# Patient Record
Sex: FEMALE | Race: ASIAN | NOT HISPANIC OR LATINO | URBAN - METROPOLITAN AREA
[De-identification: names, ages, dates, MRNs, and addresses within clinical notes are randomized per-mention and may not be internally consistent; named-entity substitution may affect disease eponyms.]

---

## 2018-02-01 ENCOUNTER — AMBULATORY - HEALTHEAST (OUTPATIENT)
Dept: MULTI SPECIALTY CLINIC | Facility: CLINIC | Age: 28
End: 2018-02-01

## 2018-02-01 LAB — PAP SMEAR - HIM PATIENT REPORTED: NORMAL

## 2018-09-11 ENCOUNTER — COMMUNICATION - HEALTHEAST (OUTPATIENT)
Dept: FAMILY MEDICINE | Facility: CLINIC | Age: 28
End: 2018-09-11

## 2018-09-11 ENCOUNTER — OFFICE VISIT - HEALTHEAST (OUTPATIENT)
Dept: FAMILY MEDICINE | Facility: CLINIC | Age: 28
End: 2018-09-11

## 2018-09-11 DIAGNOSIS — Z76.89 ESTABLISHING CARE WITH NEW DOCTOR, ENCOUNTER FOR: ICD-10-CM

## 2018-09-11 DIAGNOSIS — L91.0 KELOID SCAR: ICD-10-CM

## 2018-09-11 DIAGNOSIS — H53.8 BLURRY VISION: ICD-10-CM

## 2018-09-11 DIAGNOSIS — Z00.00 ANNUAL PHYSICAL EXAM: ICD-10-CM

## 2018-09-11 LAB
HCG UR QL: NEGATIVE
SP GR UR STRIP: 1.02 (ref 1–1.03)

## 2018-09-11 ASSESSMENT — MIFFLIN-ST. JEOR: SCORE: 1289.69

## 2018-09-12 ENCOUNTER — COMMUNICATION - HEALTHEAST (OUTPATIENT)
Dept: FAMILY MEDICINE | Facility: CLINIC | Age: 28
End: 2018-09-12

## 2018-09-14 ENCOUNTER — OFFICE VISIT - HEALTHEAST (OUTPATIENT)
Dept: FAMILY MEDICINE | Facility: CLINIC | Age: 28
End: 2018-09-14

## 2018-09-14 DIAGNOSIS — Z30.017 NEXPLANON INSERTION: ICD-10-CM

## 2018-10-10 ENCOUNTER — OFFICE VISIT - HEALTHEAST (OUTPATIENT)
Dept: FAMILY MEDICINE | Facility: CLINIC | Age: 28
End: 2018-10-10

## 2018-10-10 DIAGNOSIS — Z20.5 EXPOSURE TO HEPATITIS B: ICD-10-CM

## 2018-10-10 DIAGNOSIS — Z28.39 IMMUNIZATION DEFICIENCY: ICD-10-CM

## 2018-10-10 DIAGNOSIS — Z23 NEED FOR IMMUNIZATION AGAINST INFLUENZA: ICD-10-CM

## 2018-10-10 ASSESSMENT — MIFFLIN-ST. JEOR: SCORE: 1288.55

## 2018-10-12 LAB
HBV SURFACE AG SERPL QL IA: NEGATIVE
HEPATITIS B SURFACE ANTIBODY LHE- HISTORICAL: POSITIVE

## 2018-10-15 ENCOUNTER — COMMUNICATION - HEALTHEAST (OUTPATIENT)
Dept: FAMILY MEDICINE | Facility: CLINIC | Age: 28
End: 2018-10-15

## 2020-01-31 ENCOUNTER — OFFICE VISIT - HEALTHEAST (OUTPATIENT)
Dept: FAMILY MEDICINE | Facility: CLINIC | Age: 30
End: 2020-01-31

## 2020-01-31 DIAGNOSIS — L30.9 ECZEMA, UNSPECIFIED TYPE: ICD-10-CM

## 2020-01-31 RX ORDER — HYDROXYZINE HYDROCHLORIDE 25 MG/1
25-50 TABLET, FILM COATED ORAL EVERY 6 HOURS PRN
Qty: 60 TABLET | Refills: 0 | Status: SHIPPED | OUTPATIENT
Start: 2020-01-31

## 2020-01-31 RX ORDER — TRIAMCINOLONE ACETONIDE 1 MG/G
CREAM TOPICAL
Qty: 60 G | Refills: 1 | Status: SHIPPED | OUTPATIENT
Start: 2020-01-31

## 2020-01-31 ASSESSMENT — MIFFLIN-ST. JEOR: SCORE: 1215.97

## 2021-06-02 VITALS — HEIGHT: 59 IN | BODY MASS INDEX: 29.84 KG/M2 | WEIGHT: 148 LBS

## 2021-06-02 VITALS — BODY MASS INDEX: 30.04 KG/M2 | WEIGHT: 148 LBS

## 2021-06-02 VITALS — WEIGHT: 149 LBS | HEIGHT: 59 IN | BODY MASS INDEX: 30.04 KG/M2

## 2021-06-04 VITALS
DIASTOLIC BLOOD PRESSURE: 66 MMHG | WEIGHT: 133 LBS | HEIGHT: 59 IN | HEART RATE: 74 BPM | BODY MASS INDEX: 26.81 KG/M2 | SYSTOLIC BLOOD PRESSURE: 94 MMHG | RESPIRATION RATE: 14 BRPM

## 2021-06-05 NOTE — PROGRESS NOTES
ASSESSMENT/PLAN:  1. Eczema, unspecified type  triamcinolone (KENALOG) 0.1 % cream    hydrOXYzine HCl (ATARAX) 25 MG tablet       This is a 28 yo female with rash - this may be a contact dermatitis although no offending agent is identified.  Appears eczematous - add Triamcinolone cream to areas affected; use Hydroxyzine as needed for bedtime - to decrease itching  Return in about 2 weeks (around 2/14/2020).      There are no discontinued medications.  There are no Patient Instructions on file for this visit.    Chief Complaint:  Chief Complaint   Patient presents with     itchy skin       HPI:   Viraj Zhang is a 29 y.o. female c/o  Broke out in a rash a week ago  No new exposures - no new medications    No one at home with a rash  Rash is around neck line/right abdomen       PMH:   Patient Active Problem List    Diagnosis Date Noted     Nexplanon insertion 09/14/2018     Keloid scar 09/11/2018     History reviewed. No pertinent past medical history.  History reviewed. No pertinent surgical history.  Social History     Socioeconomic History     Marital status:      Spouse name: Not on file     Number of children: Not on file     Years of education: Not on file     Highest education level: Not on file   Occupational History     Not on file   Social Needs     Financial resource strain: Not on file     Food insecurity:     Worry: Not on file     Inability: Not on file     Transportation needs:     Medical: Not on file     Non-medical: Not on file   Tobacco Use     Smoking status: Never Smoker     Smokeless tobacco: Never Used     Tobacco comment: no tobacco exposure   Substance and Sexual Activity     Alcohol use: Not on file     Drug use: Not on file     Sexual activity: Yes     Partners: Male     Birth control/protection: None   Lifestyle     Physical activity:     Days per week: Not on file     Minutes per session: Not on file     Stress: Not on file   Relationships     Social connections:     Talks on phone: Not  "on file     Gets together: Not on file     Attends Mu-ism service: Not on file     Active member of club or organization: Not on file     Attends meetings of clubs or organizations: Not on file     Relationship status: Not on file     Intimate partner violence:     Fear of current or ex partner: Not on file     Emotionally abused: Not on file     Physically abused: Not on file     Forced sexual activity: Not on file   Other Topics Concern     Not on file   Social History Narrative    Originally from [facility].  [place].  Moved to [place] about 1-1/2 years ago.  Just moved to [place] because her family has friends here.     History reviewed. No pertinent family history.    Meds:    Current Outpatient Medications:      hydrOXYzine HCl (ATARAX) 25 MG tablet, Take 1-2 tablets (25-50 mg total) by mouth every 6 (six) hours as needed for itching., Disp: 60 tablet, Rfl: 0     triamcinolone (KENALOG) 0.1 % cream, Apply topically sparingly two times a day to affected areas only, Disp: 60 g, Rfl: 1    Current Facility-Administered Medications:      lidocaine 1%-EPINEPHrine 1:100,000 1 %-1:100,000 injection 2.5 mL (XYLOCAINE W/EPI), 2.5 mL, Other, Once, Margi Fleming,      lidocaine 10 mg/mL (1 %) injection 2.5 mL, 2.5 mL, Other, Once, Margi Fleming, DO    Allergies:  No Known Allergies    ROS:  Pertinent positives as noted in HPI; otherwise 12 point ROS negative.      Physical Exam:  EXAM:  BP 94/66 (Patient Site: Left Arm, Patient Position: Sitting, Cuff Size: Adult Regular)   Pulse 74   Resp 14   Ht 4' 10.5\" (1.486 m)   Wt 133 lb (60.3 kg)   BMI 27.32 kg/m     Gen:  NAD, appears well, well-hydrated  HEENT:  TMs nl, oropharynx benign, nasal mucosa nl, conjunctiva clear  Neck:  Supple, no adenopathy, no thyromegaly, no carotid bruits, no JVD  Lungs:  Clear to auscultation bilaterally  Cor:  RRR no murmur  Abd:  Soft, nontender, BS+, no masses, no guarding or rebound, no HSM  Extr:  " Neg.  Neuro:  No asymmetry  Skin:  Warm/dry, some excoriations on extremities - some small scaly patches; excoriation on abdomen        Results:  Results for orders placed or performed in visit on 10/10/18   Hepatitis B Surface Antigen (HBsAG)   Result Value Ref Range    Hepatitis B Surface Ag Negative Negative   Hepatitis B Surface Antibody (Anti-HBs)   Result Value Ref Range    Hep B Surface Antibody Positive (!) Negative

## 2021-06-16 PROBLEM — Z30.017 NEXPLANON INSERTION: Status: ACTIVE | Noted: 2018-09-14

## 2021-06-16 PROBLEM — L91.0 KELOID SCAR: Status: ACTIVE | Noted: 2018-09-11

## 2021-06-20 NOTE — PROGRESS NOTES
ASSESMENT AND PLAN:  Diagnoses and all orders for this visit:    Exposure to hepatitis B  -     Hepatitis B Surface Antigen (HBsAG)  -     Hepatitis B Surface Antibody (Anti-HBs)   is confirmed surface antigen positive.  Reviewed transmission with the patient and her .  All of the children are immunized.    Immunization deficiency, Need for immunization against influenza  -     Influenza, Seasonal,Quad Inj, 36+ MOS    Green Card/update imm.  Counseling done on immunization history and requirements with the patient.  Reviewed available immunization history and relevant lab records with patient and family.  Immunizations given as documented in the EHR and on form.  Acetaminophen as needed for post-immunization fever or myalgias.  [employer] form I-693 completed today with the patient.  Given to patient in a sealed labeled envelope and a copy is being scanned into the EHR.  Please see that form for further details.  Patient directed to follow-up in clinic for routine and preventative care.         SUBJECTIVE: 20-year-old female here for green card exam.  No recent fevers, no other concerns, no history of immunization reactions or problems.  Her  is noted to be hepatitis B surface antigen positive.  Patient does not have documentation of hepatitis B testing in the past.    No past medical history on file.  Patient Active Problem List   Diagnosis     Keloid scar     Nexplanon insertion     No current outpatient prescriptions on file.     Current Facility-Administered Medications   Medication Dose Route Frequency Provider Last Rate Last Dose     lidocaine 1%-EPINEPHrine 1:100,000 1 %-1:100,000 injection 2.5 mL (XYLOCAINE W/EPI)  2.5 mL Other Once Margi Fleming DO         lidocaine 10 mg/mL (1 %) injection 2.5 mL  2.5 mL Other Once Margi Fleming DO         History   Smoking Status     Never Smoker   Smokeless Tobacco     Never Used     Comment: no tobacco exposure  "      OBJECTICE: /60 (Patient Site: Right Arm, Patient Position: Sitting, Cuff Size: Adult Regular)  Pulse 68  Temp 98.1  F (36.7  C) (Oral)   Resp 20  Ht 4' 10.5\" (1.486 m)  Wt 149 lb (67.6 kg)  SpO2 99%  Breastfeeding? No  BMI 30.61 kg/m2     No results found for this or any previous visit (from the past 24 hour(s)).     GEN-alert, appropriate, in no apparent distress   CV-regular rate and rhythm with no murmur   RESP-lungs clear to auscultation     Ayo Crane          "

## 2021-06-20 NOTE — PROGRESS NOTES
Due to language barrier, an  was present during the history-taking and subsequent discussion (and for part of the physical exam) with this patient.    ANNUAL       Chief Complaint   Patient presents with     Annual Exam     moved to [place] from [facility]     nexplanon       HISTORY OF PRESENT ILLNESS:  Pt is a 28 y.o.  year old   alert female who presents today for an annual exam.    Concerns today: blurry vision with reading.  Has never had an eye exam.  Wondering if she needs glasses.    Contraception: Interested in the Nexplanon not currently on anything. Last child born 17. Breast feeding exclusively. LMP below. Since LMP hasn't had sex.     Std screening: declines    Healthy Habits:   Regular Exercise: yes  Sunscreen Use: yes  Healthy Diet: yes  Dental Visits Regularly: no  Seat Belt: yes  Sexually active: yes  Self Breast Exam Monthly:no    Patient's last menstrual period was 2018 (exact date).    Allergies not on file    No current outpatient prescriptions on file prior to visit.     No current facility-administered medications on file prior to visit.        No past medical history on file.    No past surgical history on file.    Social History     Social History     Marital status:      Spouse name: N/A     Number of children: N/A     Years of education: N/A     Occupational History     Not on file.     Social History Main Topics     Smoking status: Never Smoker     Smokeless tobacco: Never Used      Comment: no tobacco exposure     Alcohol use Not on file     Drug use: Not on file     Sexual activity: Yes     Partners: Male     Birth control/ protection: None     Other Topics Concern     Not on file     Social History Narrative     No narrative on file       No family history on file.    OB History     No data available          GYNECOLOGIC HISTORY:  Viraj Zhang has no history of STDs.  Viraj Zhang has no history of abnormal Pap smears.   Last pap result was normal per  "patient..    REVIEW OF SYSTEMS:    Constitutional:  Denies Headache.  No weight changes.  No fevers or chills.    HEENT:  Denies vision changes or hearing changes. No sinus problems.  Blurry vision  Breasts:  Denies breast masses, pain or nipple discharge.    Respiratory:  No breathing issues, cough or shortness of breath  Cardiovascular:  Denies chest pain, syncope or palpitations.    GI:  Denies nausea, vomiting, diarrhea, or constipation  Endocrine:  Denies hot flashes, night sweats, heat or cold intolerance.  Hematologic:  Denies easy bruising or bleeding disorders.  Allergies/Immunologic:  Denies seasonal allergies or any history of immunologic disorders  Neurologic:  Normal sensation and motor control.  No history of seizures or syncope.  Musculoskeletal:  Denies joint pain, swelling, or erythema  Skin:  Denies rashes, significant lesions or pruritis.  Psychiatric:  Denies anxiety, depression, memory deficits, and appetite or sleep changes.    PHYSICAL EXAM  BP 96/62 (Patient Site: Left Arm, Patient Position: Sitting, Cuff Size: Adult Regular)  Pulse 80  Temp 97.8  F (36.6  C) (Oral)   Resp 20  Ht 4' 10.86\" (1.495 m)  Wt 148 lb (67.1 kg)  LMP 08/27/2018 (Exact Date)  Breastfeeding? Yes  BMI 30.04 kg/m2  GENERAL APPEARANCE:  The patient is a pleasant, normal appearing female with normal affect and in no distress.  HEENT: Normocephalic atraumatic.  PERRL, ocular muscles intact.  No conjunctivitis or icterus noticed.  TMs clear with good cone of light reflex.  Oropharynx clear and moist mucous membranes.  NECK: supple.  No cervical lymphadenopathy.  No thyromegaly, no nodules palpated, trachea midline.  LUNGS: Clear bilaterally with normal respiratory effort  HEART:   Regular rate and rhythm.  No murmurs noted.  Pulses are full and symmetrical.  BREASTS: Breast exam performed seated and supine.  No masses, non-tender, no nipple discharge or lymphadenopathy.  ABDOMEN: Soft, non-tender, non-distended.  No " hepatosplenomegaly.  Normal bowel sounds.  No umbilical or inguinal hernias.  SKIN:  Warm and dry to touch.  No lesions or rashes noted.  + left ear has keloid where ear was pierced   PSYCHIATRIC/NEUROLOGIC:  Appropriate mood and affect, normal recall, alert and oriented x 3  EXTREMITIES:  Warm and well perfused.  No edema noted.  Muscle strength and sensation are normal bilaterally 5/5 in both upper and lower extremities.   :  Deferred - PP visit and pap 7 months ago.     Mu was seen today for annual exam and contraception.    Diagnoses and all orders for this visit:    Establishing care with new doctor, encounter for: History, meds and allergies reviewed and updated in the chart.    Annual physical exam:   Self breast exam risks/benefits reviewed  Safe sex practices reviewed with patient  Contraception reviewed and desires Nexplanon.  HPV testing dicussed and new Pap smear recommendations reviewed with patient: She is up-to-date per patient had Pap 7 months ago.  Will obtain records from clinic in [place].      Blurry vision  -     Ambulatory referral to Optometry    Contraception: Urine pregnancy test today.  We will have her return Friday for Nexplanon placement.  Discussed abstinence until Nexplanon placed.  -     POCT pregnancy, urine    Keloid scar             Margi Fleming

## 2021-06-20 NOTE — PROGRESS NOTES
CC:  Here for Nexplanon insertion today   HPI:  Pt is a  28 y.o.  alert female who presents for Nexplanon insertion today. Reviewed with patient all contraceptive options including:  OCP, Ortho Evra, Nuvaring, Depo Provera, IUD-both Mirena and ParaGard, nexplanon, condoms and diaphragm.  After hearing all of her options, the patient chose Nexplanon.  We discussed risks and benefits of nexplanon  including:   the possible side effect of metrorrhagia with irregular spotting or bleeding within the first 3-6 months after insertion as well as the 20% risk of amenorrhea.  Other minor side effects were discussed including:  headache (16 percent), weight gain (12 percent), acne (12 percent), breast tenderness (10 percent), emotional lability (6 percent) and abdominal pain (5 percent), pain at insertion site.  Informed consent was obtained.  Pregnancy test was negative 3 days ago and has not had sex since her last menstrual cycle.      Patient's last menstrual period was 2018 (exact date).  No Known Allergies    No past medical history on file.       No past surgical history on file.  Social History     Social History     Marital status:      Spouse name: N/A     Number of children: N/A     Years of education: N/A     Occupational History     Not on file.     Social History Main Topics     Smoking status: Never Smoker     Smokeless tobacco: Never Used      Comment: no tobacco exposure     Alcohol use Not on file     Drug use: Not on file     Sexual activity: Yes     Partners: Male     Birth control/ protection: None     Other Topics Concern     Not on file     Social History Narrative    Originally from [facility].  Went to [place] in .  Moved to [place] about 1-1/2 years ago.  Just moved to [place] because her family has friends here.                        ROS:    Constitutional:  Denies Headache.  No weight changes.  No fevers or chills.    HEENT:  Denies vision changes or hearing  changes. No sinus problems.  Respiratory:  No breathing issues, cough or shortness of breath  Cardiovascular:  Denies chest pain, syncope or palpitations.    GI:  Denies nausea, vomiting, diarrhea, or constipation  Musculoskeletal:  Denies joint pain, swelling, or erythema  Skin:  Denies rashes, significant lesions or pruritis.    PHYSICAL EXAM  /70 (Patient Site: Left Arm, Patient Position: Sitting, Cuff Size: Adult Regular)  Pulse 76  Resp 18  Wt 148 lb (67.1 kg)  LMP 08/27/2018 (Exact Date)  BMI 30.04 kg/m2  GENERAL APPEARANCE:  The patient is a pleasant, normal appearing  female with normal affect and in no distress.    PROCEDURE:   A point approximately 9 cm from the medial epicondyle on the upper inner left arm was identified and cleaned with alcohol. This was injected with 2.5cc of 1% Lidocaine with epi along the planned insertion canal. The site was then prepped with Betadyne. The Nexplanon insertion needle was removed from the sterile pack.  The Nexplanon was then inserted just underneath the surface of the skin in the recommended fashion.  Following removal of the insertion needle, the Nexplanon implant was palpated by both the patient and myself.   The insertion site was then covered with an adhesive bandage and then covered with a pressure bandage.  There were no complications and the patient tolerated the procedure well.      ASSESSMENT:  Nexplanon insertion    PLAN:  The paperwork was filled out and the user care was given to the patient.    The patient was advised to call if she experienced any significant pain, redness or swelling at the incision site or over the ru.    Follow up prn Nexplanon check.  Patient to leave pressure dressing in place for 24h.  Patient to leave adhesive bandage in place for 3 days.  Bleeding precautions given, patient is to call for any heavy bleeding > 1 pad per hour, severe pain or fevers.  Back up contraception advised for 7 days.

## 2021-07-03 NOTE — ADDENDUM NOTE
Addendum Note by Renetta Chaney CMA at 9/14/2018  1:55 PM     Author: Renetta Chaney CMA Service: -- Author Type: Certified Medical Assistant    Filed: 9/14/2018  1:55 PM Encounter Date: 9/14/2018 Status: Signed    : Renetta Chaney CMA (Certified Medical Assistant)    Addended by: RENETTA CHANEY on: 9/14/2018 01:55 PM        Modules accepted: Orders

## 2021-07-03 NOTE — ADDENDUM NOTE
Addendum Note by Margi Fleming DO at 9/14/2018  1:57 PM     Author: Margi Fleming DO Service: -- Author Type: Physician    Filed: 9/14/2018  1:57 PM Encounter Date: 9/14/2018 Status: Signed    : Margi Fleming DO (Physician)    Addended by: MARGI FLEMING on: 9/14/2018 01:57 PM        Modules accepted: Orders

## 2021-07-03 NOTE — ADDENDUM NOTE
Addendum Note by Renetta Chaney CMA at 9/14/2018 11:55 AM     Author: Renetta Chaney CMA Service: -- Author Type: Certified Medical Assistant    Filed: 9/14/2018 11:55 AM Encounter Date: 9/14/2018 Status: Signed    : Renetta Chaney CMA (Certified Medical Assistant)    Addended by: RENETTA CHANEY on: 9/14/2018 11:55 AM        Modules accepted: Orders